# Patient Record
Sex: MALE | Race: WHITE
[De-identification: names, ages, dates, MRNs, and addresses within clinical notes are randomized per-mention and may not be internally consistent; named-entity substitution may affect disease eponyms.]

---
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
## 2021-07-29 NOTE — P.HP
Psychiatric H&P





- .


History & Physical: 


 Allergies











Allergy/AdvReac Type Severity Reaction Status Date / Time


 


No Known Allergies Allergy   Verified 12/18/18 18:31








 Vital Signs











Temp  97.5 F L  12/19/18 06:41


 


Pulse  86   12/19/18 08:15


 


Resp  16   12/19/18 08:15


 


BP  109/62   12/19/18 08:15


 


Pulse Ox  100   12/18/18 18:13








 Intake & Output











 12/18/18 12/19/18 12/19/18





 18:59 06:59 18:59


 


Weight 80.1 kg  








 Laboratory Last Values











WBC  6.6 k/uL (4.0-11.0)   12/19/18  08:34    


 


RBC  5.46 m/uL (4.30-5.90)   12/19/18  08:34    


 


Hgb  15.4 gm/dL (13.0-17.5)   12/19/18  08:34    


 


Hct  47.1 % (39.0-53.0)   12/19/18  08:34    


 


MCV  86.3 fL (80.0-100.0)   12/19/18  08:34    


 


MCH  28.2 pg (25.0-35.0)   12/19/18  08:34    


 


MCHC  32.7 g/dL (31.0-37.0)   12/19/18  08:34    


 


RDW  12.6 % (11.5-15.5)   12/19/18  08:34    


 


Plt Count  201 k/uL (150-450)   12/19/18  08:34    


 


Neutrophils %  48 %  12/19/18  08:34    


 


Lymphocytes %  43 %  12/19/18  08:34    


 


Monocytes %  5 %  12/19/18  08:34    


 


Eosinophils %  2 %  12/19/18  08:34    


 


Basophils %  1 %  12/19/18  08:34    


 


Neutrophils #  3.1 k/uL (1.3-7.7)   12/19/18  08:34    


 


Lymphocytes #  2.8 k/uL (1.0-4.8)   12/19/18  08:34    


 


Monocytes #  0.3 k/uL (0-1.0)   12/19/18  08:34    


 


Eosinophils #  0.1 k/uL (0-0.7)   12/19/18  08:34    


 


Basophils #  0.1 k/uL (0-0.2)   12/19/18  08:34    


 


Sodium  143 mmol/L (137-145)   12/19/18  08:34    


 


Potassium  4.3 mmol/L (3.5-5.1)   12/19/18  08:34    


 


Chloride  106 mmol/L ()   12/19/18  08:34    


 


Carbon Dioxide  27 mmol/L (22-30)   12/19/18  08:34    


 


Anion Gap  10 mmol/L  12/19/18  08:34    


 


BUN  15 mg/dL (8-21)   12/19/18  08:34    


 


Creatinine  0.89 mg/dL (0.66-1.25)   12/19/18  08:34    


 


Est GFR (CKD-EPI)AfAm  >90  (>60 ml/min/1.73 sqM)   12/19/18  08:34    


 


Est GFR (CKD-EPI)NonAf  >90  (>60 ml/min/1.73 sqM)   12/19/18  08:34    


 


Glucose  100 mg/dL (74-99)  H  12/19/18  08:34    


 


Calcium  10.3 mg/dL (8.4-10.3)   12/19/18  08:34    


 


Total Bilirubin  1.2 mg/dL (0.2-1.3)   12/19/18  08:34    


 


AST  24 U/L (17-59)   12/19/18  08:34    


 


ALT  28 U/L (21-72)   12/19/18  08:34    


 


Alkaline Phosphatase  88 U/L ()   12/19/18  08:34    


 


Total Protein  7.2 g/dL (6.3-8.2)   12/19/18  08:34    


 


Albumin  4.6 g/dL (3.5-5.0)   12/19/18  08:34    


 


Triglycerides  168 mg/dL (<150)  H  12/19/18  08:34    


 


Cholesterol  133 mg/dL (<200)   12/19/18  08:34    


 


LDL Cholesterol, Calc  55 mg/dL (0-99)   12/19/18  08:34    


 


HDL Cholesterol  44 mg/dL (40-60)   12/19/18  08:34    


 


TSH  4.600 mIU/L (0.465-4.680)   12/19/18  08:34    











12/19/18 11:16


IDENTIFYING DATA: This patient is an 18-year-old single  male who was 

admitted to the mental health unit through the emergency room for acute 

suicidal ideation.


HPI: Patient states he's been struggling with progressively worsening symptoms 

of depression for the last 6-12 months.  He finds that he is excessively 

sleeping his energy is low and he is more frequently tearful.  He feels 

apathetic towards activities that used to be enjoyable and motivation is 

significantly decreased.  He finds himself experiencing more worry and anxiety.

  He has been thinking about ways that he would die but states he doesn't think 

he would ever attempt suicide.  For basketball he was recently in [place] and 

was on top of the [employer] imagining what would be like to fall to his 

death.  He is a college student at [employer] and he 

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  He states that he has failed all of 

his classes from last semester and is now in eligible to play ball next 

semester.  He states he doesn't know if his life is over now and if this can be 

fixed.  He has seen a psychiatrist several months ago and was placed on Prozac 

20 mg daily we presume.  He states he complied with the medicine consistently 

for about 2 weeks and then it was much more difficult for him to stay on the 

medicine.  He thinks it may have been starting to work and there was no side 

effect perceived.  He reports he simply could not remember to take the medicine 

in many weeks ago by where he only took it once a week.  He did work with an 

individual therapist for 2 months but felt that they were not connecting and he 

found no value from it.  He endorses consistent worry on a regular basis but 

only since he has been depressed and failed his classes.  He states typically 

he is not a worrier.  No reported panic attacks.  He is reporting no homicidal 

ideation intent or plan.  He is endorsing no auditory or visual hallucinations 

or any specific delusions.  He endorses no history of hypomanic or manic 

episodes.  He reports he was treated with Concerta for most of his high school 

years.  He states that he did provide some benefit but he does not feel that he 

requires that medication to navigate his college classes.


PAST PSYCHIATRIC HISTORY: This is the patient's first inpatient psychiatric 

admission, no history of suicide attempts, no history of any other self-

injurious behaviors such as cutting or burning.  He has been prescribed Prozac 

we presume 20 mg daily.


PMH: Exercise-induced bronchospasm treated as needed with an inhaler


ALLERGIES: NO KNOWN DRUG ALLERGIES


MEDICATIONS: Prozac, when necessary inhaler


CHEMICAL DEPENDENCY HISTORY: He reports using alcohol 1-2 times a month having 5

-6 drinks, uses marijuana on a daily basis, he reports no other use of illicit 

drugs he's never been placed in residential treatment for chemical dependency 

reasons


FAMILY PSYCHIATRIC HISTORY: He states his mother has dealt with depression and 

was hospitalized inpatient, his father was experiencing symptoms of anxiety, he 

is not aware if either have taken a medication that was beneficial, no suicides 

in the family


FAMILY CHEMICAL DEPENDENCY HISTORY: His father is known to have an alcohol use 

disorder and continues to drink both grandfathers are also known to have an 

alcohol use disorder


SOCIAL HISTORY: The patient is 18 years old he single he has no children he 

resides with his grandfather here in [facility].  He is originally 

from [place].  His parents reside in [place].  He states he 

is very close with his mother.  He has a good relationship with his father but 

he wishes they were closer.  He has 3 stepsiblings and one half sibling.  He 

was an only child until his mother remarried.  He had a girlfriend up until 

this last summer and he terminated the relationship as he did not feel he can 

handle it with school and basketball.  He is on a scholarship for basketball at 

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 

player.  He graduated high school at 2.5 grade point average.  No history of 

special education curriculum.  No history of  service.  No legal 

history reported, no abuse history reported.


MENTAL STATUS EXAM: The patient is a tall thin  male appearing his 

stated age.  He is dressed in his own clothing but is covered with a full size 

blanket that he wraps around his head leaving his face exposed.  Eye contact is 

intermittent.  Speech is fluent spontaneous nonpressured.  He endorses a 

depressed and anxious mood he reports recent suicidal ideation with no acute 

intent or plan.  No homicidal ideation intent or plan.  He endorses feelings of 

hopelessness.  He demonstrates no verbal or physical aggressiveness.  He 

demonstrates no involuntary repetitive movements.  Insight and judgment 

limited.  Thought process for the most part is linear he demonstrates no 

tangential thinking loose associations or flight of ideas.  He does not appear 

hypomanic or manic.  He reports no auditory or visual hallucinations or any 

specific delusions and there is no observable evidence of psychosis during our 

interaction.  He is oriented to person place and date he is able to name the 

days of the week backwards.


STRENGTHS/WEAKNESSES: Strengths: Housing, support from family weaknesses: 

Severe symptoms of depression


INTELLECTUAL FUNCTIONING: Average


IMPRESSIONS: []


1.  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 

disorder, rule out history of ADHD.


2.  Exercise-induced asthma


3.  Psychosocial dysfunction specifically at school due to mood symptoms





PLAN: The patient has been admitted to the mental health unit voluntarily.  We 

reviewed his presenting symptoms and treatment options.  We decided that we 

would continue the Prozac 20 mg daily.  He will contact his mother to see if 

either her or his father had been successfully treated with a different 

medicine for depression.  We discussed that there are alternatives to Prozac in 

terms of antidepressant treatment.  He reports no side effects from Prozac.  He 

does not feel that he needs a stimulant to address prior ADHD symptoms.  He has 

already been seen by internal medicine for routine history and physical exam, 

social work will meet with him to complete a psychosocial assessment.  We will 

monitor him for safety and encourage full participation in the milieu.  We will 

involve his family in treatment and discharge planning as he will allow.  We 

will offer suggestions for cognitive reframing as it pertains to his outlook on 

his academic situation. Alert & oriented; no sensory, motor or coordination deficits, normal reflexes